# Patient Record
Sex: MALE | Race: WHITE | NOT HISPANIC OR LATINO | Employment: OTHER | URBAN - METROPOLITAN AREA
[De-identification: names, ages, dates, MRNs, and addresses within clinical notes are randomized per-mention and may not be internally consistent; named-entity substitution may affect disease eponyms.]

---

## 2017-04-19 NOTE — PATIENT DISCUSSION
"""OCT OU today. Stable. "" ""Follow ERM w/o surgery. Call if vision decreases or distortion increases. Recommend regular Amsler checks.  """

## 2018-01-30 ENCOUNTER — NEW PATIENT COMPREHENSIVE (OUTPATIENT)
Dept: URBAN - METROPOLITAN AREA CLINIC 47 | Facility: CLINIC | Age: 73
End: 2018-01-30

## 2018-01-30 DIAGNOSIS — H43.813: ICD-10-CM

## 2018-01-30 DIAGNOSIS — H40.033: ICD-10-CM

## 2018-01-30 DIAGNOSIS — H25.813: ICD-10-CM

## 2018-01-30 DIAGNOSIS — H04.123: ICD-10-CM

## 2018-01-30 PROCEDURE — 92132 CPTRZD OPH DX IMG ANT SGM: CPT

## 2018-01-30 PROCEDURE — 92015 DETERMINE REFRACTIVE STATE: CPT

## 2018-01-30 PROCEDURE — 92004 COMPRE OPH EXAM NEW PT 1/>: CPT

## 2018-01-30 ASSESSMENT — VISUAL ACUITY
OU_SC: J4
OU_SC: 20/30
OD_CC: J1
OS_CC: J1
OS_SC: 20/30-2
OU_CC: J1
OD_SC: 20/50+2
OD_SC: J8
OS_SC: J12

## 2018-01-30 ASSESSMENT — TONOMETRY
OD_IOP_MMHG: 14
OS_IOP_MMHG: 12

## 2018-07-10 NOTE — PATIENT DISCUSSION
""""" ""Follow ERM w/o surgery. Call if vision decreases or distortion increases. Recommend regular Amsler checks.  """

## 2019-11-19 ENCOUNTER — NEW PATIENT COMPREHENSIVE (OUTPATIENT)
Dept: URBAN - METROPOLITAN AREA CLINIC 43 | Facility: CLINIC | Age: 74
End: 2019-11-19

## 2019-11-19 DIAGNOSIS — H26.491: ICD-10-CM

## 2019-11-19 DIAGNOSIS — H04.123: ICD-10-CM

## 2019-11-19 DIAGNOSIS — H43.813: ICD-10-CM

## 2019-11-19 PROCEDURE — 92014 COMPRE OPH EXAM EST PT 1/>: CPT

## 2019-11-19 PROCEDURE — 92015 DETERMINE REFRACTIVE STATE: CPT

## 2019-11-19 ASSESSMENT — VISUAL ACUITY
OD_CC: 20/20-2
OD_SC: 20/40+2
OS_CC: J2
OD_SC: J12
OD_CC: J1
OS_SC: 20/60-2+2
OS_CC: 20/25-1+2
OS_SC: J10

## 2019-11-19 ASSESSMENT — TONOMETRY
OS_IOP_MMHG: 14
OD_IOP_MMHG: 14

## 2019-11-25 ENCOUNTER — CATARACT CONSULT (OUTPATIENT)
Dept: URBAN - METROPOLITAN AREA CLINIC 43 | Facility: CLINIC | Age: 74
End: 2019-11-25

## 2019-11-25 DIAGNOSIS — H52.203: ICD-10-CM

## 2019-11-25 DIAGNOSIS — H26.491: ICD-10-CM

## 2019-11-25 PROCEDURE — 99499LK REFRACTIVE CONSULT/LASIK

## 2019-11-25 PROCEDURE — 92134 CPTRZ OPH DX IMG PST SGM RTA: CPT

## 2019-11-25 PROCEDURE — 92025NC COMP. CORNEAL TOPO, UNI OR BILAT,

## 2019-11-25 PROCEDURE — 92286 ANT SGM IMG I&R SPECLR MIC: CPT

## 2019-11-25 RX ORDER — AMOXICILLIN 500 MG/1: 1 CAPSULE ORAL

## 2019-11-25 RX ORDER — MOXIFLOXACIN HYDROCHLORIDE 5 MG/ML: 1 SOLUTION/ DROPS OPHTHALMIC

## 2019-11-25 RX ORDER — PREDNISOLONE ACETATE 10 MG/ML: 1 SUSPENSION/ DROPS OPHTHALMIC

## 2019-11-25 RX ORDER — OXYCODONE HYDROCHLORIDE AND ACETAMINOPHEN 7.5; 325 MG/1; MG/1: 1 TABLET ORAL AS DIRECTED

## 2019-11-25 RX ORDER — ZOLPIDEM TARTRATE 5 MG/1: 1 TABLET ORAL EVERY EVENING

## 2019-11-25 ASSESSMENT — VISUAL ACUITY
OS_CC: J3
OS_SC: 20/50-2
OD_SC: 20/25+1
OD_CC: J1
OS_SC: J4
OD_SC: J8

## 2019-11-25 ASSESSMENT — TONOMETRY
OS_IOP_MMHG: 15
OD_IOP_MMHG: 12

## 2019-12-05 ENCOUNTER — SURGERY/PROCEDURE (OUTPATIENT)
Dept: URBAN - METROPOLITAN AREA SURGERY 14 | Facility: SURGERY | Age: 74
End: 2019-12-05

## 2019-12-05 DIAGNOSIS — H26.491: ICD-10-CM

## 2019-12-05 PROCEDURE — 66821 AFTER CATARACT LASER SURGERY: CPT

## 2019-12-06 ENCOUNTER — YAG POST-OP (OUTPATIENT)
Dept: URBAN - METROPOLITAN AREA CLINIC 43 | Facility: CLINIC | Age: 74
End: 2019-12-06

## 2019-12-06 DIAGNOSIS — Z98.890: ICD-10-CM

## 2019-12-06 DIAGNOSIS — H35.352: ICD-10-CM

## 2019-12-06 PROCEDURE — 99024 POSTOP FOLLOW-UP VISIT: CPT

## 2019-12-06 RX ORDER — BROMFENAC SODIUM 0.7 MG/ML: 1 SOLUTION/ DROPS OPHTHALMIC ONCE A DAY

## 2019-12-06 RX ORDER — FLUOROMETHOLONE ACETATE 1 MG/ML: 1 SUSPENSION/ DROPS OPHTHALMIC

## 2019-12-06 ASSESSMENT — TONOMETRY
OS_IOP_MMHG: 14
OD_IOP_MMHG: 14

## 2019-12-06 ASSESSMENT — VISUAL ACUITY
OD_SC: 20/25-1
OS_SC: 20/50-2

## 2020-01-16 ENCOUNTER — SURGERY/PROCEDURE (OUTPATIENT)
Dept: URBAN - METROPOLITAN AREA CLINIC 43 | Facility: CLINIC | Age: 75
End: 2020-01-16

## 2020-01-16 DIAGNOSIS — H52.7: ICD-10-CM

## 2020-01-16 PROCEDURE — 66999CEL EPI-LASEK (CONVENTIONAL EPI-LASIK)

## 2020-01-17 ENCOUNTER — 1 DAY LASIK POST OP (OUTPATIENT)
Dept: URBAN - METROPOLITAN AREA CLINIC 43 | Facility: CLINIC | Age: 75
End: 2020-01-17

## 2020-01-17 DIAGNOSIS — Z98.890: ICD-10-CM

## 2020-01-17 PROCEDURE — 99024 POSTOP FOLLOW-UP VISIT: CPT

## 2020-01-17 ASSESSMENT — VISUAL ACUITY
OS_SC: 20/100+1
OD_SC: 20/30+1

## 2020-01-24 ENCOUNTER — LASIK POST OP (OUTPATIENT)
Dept: URBAN - METROPOLITAN AREA CLINIC 43 | Facility: CLINIC | Age: 75
End: 2020-01-24

## 2020-01-24 DIAGNOSIS — Z98.890: ICD-10-CM

## 2020-01-24 PROCEDURE — 99024 POSTOP FOLLOW-UP VISIT: CPT

## 2020-01-24 ASSESSMENT — VISUAL ACUITY
OD_SC: 20/40-2
OD_PH: 20/30
OS_PH: 20/40-2
OS_SC: 20/80-1

## 2020-02-17 ENCOUNTER — LASIK POST OP (OUTPATIENT)
Dept: URBAN - METROPOLITAN AREA CLINIC 43 | Facility: CLINIC | Age: 75
End: 2020-02-17

## 2020-02-17 DIAGNOSIS — Z98.890: ICD-10-CM

## 2020-02-17 PROCEDURE — 99024 POSTOP FOLLOW-UP VISIT: CPT

## 2020-02-17 ASSESSMENT — TONOMETRY
OS_IOP_MMHG: 11
OD_IOP_MMHG: 10

## 2020-02-17 ASSESSMENT — VISUAL ACUITY
OU_SC: 20/30-2
OD_SC: 20/25-2
OS_SC: 20/40

## 2020-04-15 ENCOUNTER — LASIK POST OP (OUTPATIENT)
Dept: URBAN - METROPOLITAN AREA CLINIC 43 | Facility: CLINIC | Age: 75
End: 2020-04-15

## 2020-04-15 DIAGNOSIS — Z98.890: ICD-10-CM

## 2020-04-15 PROCEDURE — 99024 POSTOP FOLLOW-UP VISIT: CPT

## 2020-04-15 ASSESSMENT — VISUAL ACUITY
OU_SC: J3
OU_SC: 20/25-2
OS_SC: 20/30+2
OD_SC: 20/30-1
OS_SC: J6
OD_SC: J12

## 2020-04-15 ASSESSMENT — TONOMETRY
OS_IOP_MMHG: 12
OD_IOP_MMHG: 12

## 2020-05-26 ENCOUNTER — EST. PATIENT EMERGENCY (OUTPATIENT)
Dept: URBAN - METROPOLITAN AREA CLINIC 43 | Facility: CLINIC | Age: 75
End: 2020-05-26

## 2020-05-26 DIAGNOSIS — Z98.890: ICD-10-CM

## 2020-05-26 PROCEDURE — 99024 POSTOP FOLLOW-UP VISIT: CPT

## 2020-05-26 ASSESSMENT — VISUAL ACUITY
OS_SC: 20/25-3
OD_SC: 20/30-1

## 2020-05-26 ASSESSMENT — TONOMETRY
OS_IOP_MMHG: 12
OD_IOP_MMHG: 12

## 2020-09-14 NOTE — PATIENT DISCUSSION
GPC OU - MILD. RESIDUAL ALONG TARSAL PLATE; ASYMPTOMATIC. CORNEAL SENSITIVITY WNL OD/OS. UNDERSTANDS TO REMAIN OUT OF CLS UNTIL LASIK PROCEDURE TO AVOID FLARE UP.

## 2020-09-14 NOTE — PATIENT DISCUSSION
New Prescription: gatifloxacin (gatifloxacin): drops: 0.5% 1 drop four times a day into both eyes 09-

## 2020-09-14 NOTE — PATIENT DISCUSSION
----- Message from Erasto Ambrocio sent at 2/4/2020  9:14 AM CST -----  Contact: Wife/Sue Rogers called in regarding patient ().  Sue stated patient went to Ochsner [id number] - [place] [address] AT [facility] & [address] [address]  Phone: [phone] Fax: [phone]    Sue's call back number is [phone]   Current RX------------( 1.3 x 78 )--------------------( 0.75 x 102 )

## 2020-09-17 NOTE — PATIENT DISCUSSION
Continue: prednisolone acetate (prednisolone acetate): drops,suspension: 1% 1 drop four times a day into both eyes 09-

## 2020-09-17 NOTE — PATIENT DISCUSSION
MYOPIA, OU- DISC OPT OF REFRACTIVE [id number]. PT UNDERSTANDS OPTIONS AND DESIRES TO PROCEED WITH LASIK TO REDUCE DEPENDENCY ON GLS/CTLS.

## 2021-01-18 ENCOUNTER — ESTABLISHED COMPREHENSIVE EXAM (OUTPATIENT)
Dept: URBAN - METROPOLITAN AREA CLINIC 43 | Facility: CLINIC | Age: 76
End: 2021-01-18

## 2021-01-18 DIAGNOSIS — H35.352: ICD-10-CM

## 2021-01-18 DIAGNOSIS — Z98.890: ICD-10-CM

## 2021-01-18 DIAGNOSIS — H18.513: ICD-10-CM

## 2021-01-18 DIAGNOSIS — H04.123: ICD-10-CM

## 2021-01-18 PROCEDURE — 92014 COMPRE OPH EXAM EST PT 1/>: CPT

## 2021-01-18 PROCEDURE — 68761S PUNCTUM PLUG / SILICONE,EACH

## 2021-01-18 PROCEDURE — 92250 FUNDUS PHOTOGRAPHY W/I&R: CPT

## 2021-01-18 PROCEDURE — 92134 CPTRZ OPH DX IMG PST SGM RTA: CPT

## 2021-01-18 PROCEDURE — 92286 ANT SGM IMG I&R SPECLR MIC: CPT

## 2021-01-18 PROCEDURE — 92025 CPTRIZED CORNEAL TOPOGRAPHY: CPT

## 2021-01-18 PROCEDURE — A4263 PERMANENT TEAR DUCT PLUG: HCPCS

## 2021-01-18 ASSESSMENT — VISUAL ACUITY
OS_CC: J1
OD_SC: 20/25
OS_SC: J6
OD_SC: J6
OD_CC: J1
OS_SC: 20/50+2

## 2021-01-18 ASSESSMENT — TONOMETRY
OD_IOP_MMHG: 08
OS_IOP_MMHG: 11

## 2021-02-08 ENCOUNTER — FOLLOW UP (OUTPATIENT)
Dept: URBAN - METROPOLITAN AREA CLINIC 43 | Facility: CLINIC | Age: 76
End: 2021-02-08

## 2021-02-08 DIAGNOSIS — H35.352: ICD-10-CM

## 2021-02-08 DIAGNOSIS — Z98.890: ICD-10-CM

## 2021-02-08 DIAGNOSIS — H04.123: ICD-10-CM

## 2021-02-08 DIAGNOSIS — H18.513: ICD-10-CM

## 2021-02-08 PROCEDURE — 92012 INTRM OPH EXAM EST PATIENT: CPT

## 2021-02-08 PROCEDURE — 92134 CPTRZ OPH DX IMG PST SGM RTA: CPT

## 2021-02-08 RX ORDER — AMOXICILLIN 500 MG/1: 1 CAPSULE ORAL

## 2021-02-08 RX ORDER — PREDNISOLONE ACETATE 10 MG/ML: 1 SUSPENSION/ DROPS OPHTHALMIC

## 2021-02-08 RX ORDER — OXYCODONE HYDROCHLORIDE AND ACETAMINOPHEN 7.5; 325 MG/1; MG/1: 1 TABLET ORAL AS NEEDED

## 2021-02-08 RX ORDER — MOXIFLOXACIN HYDROCHLORIDE 5 MG/ML: 1 SOLUTION/ DROPS OPHTHALMIC

## 2021-02-08 ASSESSMENT — VISUAL ACUITY
OS_SC: 20/30-1
OD_SC: 20/30-2

## 2021-02-08 ASSESSMENT — TONOMETRY
OD_IOP_MMHG: 11
OS_IOP_MMHG: 12

## 2021-02-18 ENCOUNTER — SURGERY/PROCEDURE (OUTPATIENT)
Dept: URBAN - METROPOLITAN AREA CLINIC 43 | Facility: CLINIC | Age: 76
End: 2021-02-18

## 2021-02-18 DIAGNOSIS — H52.7: ICD-10-CM

## 2021-02-18 PROCEDURE — 66999E2

## 2021-02-19 ENCOUNTER — 1 DAY LASIK POST OP (OUTPATIENT)
Dept: URBAN - METROPOLITAN AREA CLINIC 43 | Facility: CLINIC | Age: 76
End: 2021-02-19

## 2021-02-19 DIAGNOSIS — H04.123: ICD-10-CM

## 2021-02-19 DIAGNOSIS — Z98.890: ICD-10-CM

## 2021-02-19 PROCEDURE — 99024 POSTOP FOLLOW-UP VISIT: CPT

## 2021-02-19 ASSESSMENT — VISUAL ACUITY
OS_SC: J10
OU_SC: J8
OD_SC: 20/25-2
OS_SC: 20/60
OD_SC: J12

## 2021-02-26 ENCOUNTER — LASIK POST OP (OUTPATIENT)
Dept: URBAN - METROPOLITAN AREA CLINIC 43 | Facility: CLINIC | Age: 76
End: 2021-02-26

## 2021-02-26 DIAGNOSIS — H04.123: ICD-10-CM

## 2021-02-26 DIAGNOSIS — Z98.890: ICD-10-CM

## 2021-02-26 PROCEDURE — 99024 POSTOP FOLLOW-UP VISIT: CPT

## 2021-02-26 ASSESSMENT — VISUAL ACUITY
OD_SC: 20/30-1
OD_SC: J12
OS_SC: 20/60-1
OS_SC: J12

## 2021-03-30 ENCOUNTER — LASIK POST OP (OUTPATIENT)
Dept: URBAN - METROPOLITAN AREA CLINIC 43 | Facility: CLINIC | Age: 76
End: 2021-03-30

## 2021-03-30 DIAGNOSIS — H04.123: ICD-10-CM

## 2021-03-30 PROCEDURE — 6699955 NON-COMANAGED LASIK PO

## 2021-03-30 RX ORDER — SODIUM CHLORIDE 50 MG/G: OINTMENT OPHTHALMIC EVERY EVENING

## 2021-03-30 ASSESSMENT — VISUAL ACUITY
OS_SC: 20/60
OS_PH: 20/25-2
OD_SC: J12
OS_SC: J10-
OD_SC: 20/25-1

## 2021-04-23 ENCOUNTER — PREPPED CHART (OUTPATIENT)
Dept: URBAN - METROPOLITAN AREA CLINIC 43 | Facility: CLINIC | Age: 76
End: 2021-04-23

## 2021-04-26 ENCOUNTER — POST-OP (OUTPATIENT)
Dept: URBAN - METROPOLITAN AREA CLINIC 43 | Facility: CLINIC | Age: 76
End: 2021-04-26

## 2021-04-26 DIAGNOSIS — H04.123: ICD-10-CM

## 2021-04-26 DIAGNOSIS — Z98.890: ICD-10-CM

## 2021-04-26 PROCEDURE — 6699955 NON-COMANAGED LASIK PO

## 2021-04-26 PROCEDURE — 92025 CPTRIZED CORNEAL TOPOGRAPHY: CPT

## 2021-04-26 ASSESSMENT — VISUAL ACUITY
OS_SC: 20/40+1
OD_CC: J1
OD_SC: 20/25-2
OD_SC: J12
OS_SC: J10
OS_CC: J1

## 2021-04-26 ASSESSMENT — TONOMETRY
OD_IOP_MMHG: 09
OS_IOP_MMHG: 11

## 2021-07-19 NOTE — PATIENT DISCUSSION
all if vision decreases or RD warning signs increases/RD warnings given. No signs of right eye retinal tear, retinal detachment precautions discussed.

## 2021-07-19 NOTE — PATIENT DISCUSSION
Discussion on dry eye and the importance of lubricating. 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 Could consider probing and irrigation if symptoms worsen. Start Artificial tears both eyes as needed.

## 2021-07-21 NOTE — PATIENT DISCUSSION
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 Elevated TF OS noted on exam. Discussed referral for dacryocystorhinostomy (DCR) OS, patient defers at this time.

## 2021-07-21 NOTE — PATIENT DISCUSSION
Low Risk Glaucoma Suspect OU based on notching OS with family history of glaucoma (father). Will order HVF/OCT/Pachy next available and repeat in 1 year.

## 2021-10-05 ENCOUNTER — POST-OP (OUTPATIENT)
Dept: URBAN - METROPOLITAN AREA CLINIC 43 | Facility: CLINIC | Age: 76
End: 2021-10-05

## 2021-10-05 DIAGNOSIS — Z98.890: ICD-10-CM

## 2021-10-05 DIAGNOSIS — H04.123: ICD-10-CM

## 2021-10-05 PROCEDURE — 99024 POSTOP FOLLOW-UP VISIT: CPT

## 2021-10-05 RX ORDER — CYCLOSPORINE 0 MG/ML: SOLUTION/ DROPS OPHTHALMIC; TOPICAL TWICE A DAY

## 2021-10-05 ASSESSMENT — VISUAL ACUITY
OD_SC: 20/25+3
OS_SC: 20/25-3

## 2021-10-27 ENCOUNTER — POST-OP (OUTPATIENT)
Dept: URBAN - METROPOLITAN AREA CLINIC 43 | Facility: CLINIC | Age: 76
End: 2021-10-27

## 2021-10-27 DIAGNOSIS — H04.123: ICD-10-CM

## 2021-10-27 DIAGNOSIS — Z98.890: ICD-10-CM

## 2021-10-27 PROCEDURE — 99024 POSTOP FOLLOW-UP VISIT: CPT

## 2021-10-27 ASSESSMENT — VISUAL ACUITY
OD_SC: J12
OS_SC: 20/30-2
OD_SC: 20/25-1+2
OS_SC: J12

## 2021-10-27 ASSESSMENT — TONOMETRY
OD_IOP_MMHG: 14
OS_IOP_MMHG: 14

## 2021-11-24 NOTE — PATIENT DISCUSSION
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 Elevated TF OS noted on exam. Will refer to Dr. Ayala Reeder for dacryocystorhinostomy ([facility].

## 2022-01-27 ENCOUNTER — EMERGENCY VISIT (OUTPATIENT)
Dept: URBAN - METROPOLITAN AREA CLINIC 43 | Facility: CLINIC | Age: 77
End: 2022-01-27

## 2022-01-27 DIAGNOSIS — H43.813: ICD-10-CM

## 2022-01-27 PROCEDURE — 92012 INTRM OPH EXAM EST PATIENT: CPT

## 2022-01-27 ASSESSMENT — TONOMETRY
OS_IOP_MMHG: 12
OD_IOP_MMHG: 10

## 2022-01-27 ASSESSMENT — VISUAL ACUITY
OS_SC: 20/30+2
OU_SC: 20/25-1
OD_SC: 20/25-2

## 2022-03-30 NOTE — PATIENT DISCUSSION
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 Elevated TF OS noted on exam. Will refer to Dr. Dwayne Majano for dacryocystorhinostomy (2025 [facility].

## 2022-04-19 NOTE — PATIENT DISCUSSION
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 Elevated TF OS noted on exam. Will refer to Dr. Wale Nieto for dacryocystorhinostomy (2025 [facility].

## 2022-04-27 ENCOUNTER — COMPREHENSIVE EXAM (OUTPATIENT)
Dept: URBAN - METROPOLITAN AREA CLINIC 43 | Facility: CLINIC | Age: 77
End: 2022-04-27

## 2022-04-27 DIAGNOSIS — Z98.890: ICD-10-CM

## 2022-04-27 PROCEDURE — 99024 POSTOP FOLLOW-UP VISIT: CPT

## 2022-04-27 ASSESSMENT — VISUAL ACUITY
OD_SC: 20/25-3
OS_CC: J2
OS_SC: 20/30-2
OS_SC: J12
OD_SC: J12
OD_CC: J1

## 2022-04-27 ASSESSMENT — TONOMETRY
OD_IOP_MMHG: 6
OS_IOP_MMHG: 8

## 2022-05-20 NOTE — PATIENT DISCUSSION
Start WC ou tid with Impact mask, Bactrim DS PO BID x 7 days, tobradex os qid.  F/u 10 days Dr. Toñito Mercer.

## 2022-05-20 NOTE — PATIENT DISCUSSION
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 Elevated TF OS noted on exam. Will refer to Dr. Parvez Canales for dacryocystorhinostomy (2025 [facility].

## 2022-06-14 NOTE — PATIENT DISCUSSION
If patient has another flair up could consider starting a cycle of Doxycycline. no fever and no chills.

## 2022-06-14 NOTE — PATIENT DISCUSSION
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 Elevated TF OS noted on exam. Will refer to Dr. Rebecca Presley for dacryocystorhinostomy (2025 [facility].

## 2023-04-27 ENCOUNTER — COMPREHENSIVE EXAM (OUTPATIENT)
Dept: URBAN - METROPOLITAN AREA CLINIC 43 | Facility: CLINIC | Age: 78
End: 2023-04-27

## 2023-04-27 DIAGNOSIS — H52.4: ICD-10-CM

## 2023-04-27 DIAGNOSIS — H43.813: ICD-10-CM

## 2023-04-27 DIAGNOSIS — H04.123: ICD-10-CM

## 2023-04-27 PROCEDURE — 92015 DETERMINE REFRACTIVE STATE: CPT

## 2023-04-27 PROCEDURE — 92014 COMPRE OPH EXAM EST PT 1/>: CPT

## 2023-04-27 ASSESSMENT — TONOMETRY
OS_IOP_MMHG: 10
OD_IOP_MMHG: 10

## 2023-04-27 ASSESSMENT — VISUAL ACUITY
OD_SC: J12
OS_CC: J3
OS_SC: J12
OD_SC: 20/20-2
OS_SC: 20/40-1
OD_CC: J1

## 2023-12-13 ENCOUNTER — CONSULTATION/EVALUATION (OUTPATIENT)
Dept: URBAN - METROPOLITAN AREA CLINIC 43 | Facility: CLINIC | Age: 78
End: 2023-12-13

## 2023-12-13 DIAGNOSIS — H35.352: ICD-10-CM

## 2023-12-13 DIAGNOSIS — H04.123: ICD-10-CM

## 2023-12-13 DIAGNOSIS — H18.513: ICD-10-CM

## 2023-12-13 DIAGNOSIS — Z96.1: ICD-10-CM

## 2023-12-13 DIAGNOSIS — H43.813: ICD-10-CM

## 2023-12-13 PROCEDURE — 99199RSD RESIDENT SHADOWING PROVIDER

## 2023-12-13 PROCEDURE — 92134 CPTRZ OPH DX IMG PST SGM RTA: CPT

## 2023-12-13 PROCEDURE — 99214 OFFICE O/P EST MOD 30 MIN: CPT

## 2023-12-13 PROCEDURE — 68761Q PUNCTAL PLUG/QUINTESS DISSOLVABLE PLUG/EACH

## 2023-12-13 PROCEDURE — 92025 CPTRIZED CORNEAL TOPOGRAPHY: CPT

## 2023-12-13 PROCEDURE — 92286 ANT SGM IMG I&R SPECLR MIC: CPT

## 2023-12-13 PROCEDURE — A4262 TEMPORARY TEAR DUCT PLUG: HCPCS

## 2023-12-13 ASSESSMENT — TONOMETRY
OD_IOP_MMHG: 12
OS_IOP_MMHG: 12

## 2023-12-13 ASSESSMENT — VISUAL ACUITY
OS_SC: 20/40-1
OD_SC: J12
OD_SC: 20/25+1
OD_CC: J2
OS_SC: J12
OS_CC: J3

## 2024-06-12 ENCOUNTER — FOLLOW UP (OUTPATIENT)
Dept: URBAN - METROPOLITAN AREA CLINIC 43 | Facility: CLINIC | Age: 79
End: 2024-06-12

## 2024-06-12 DIAGNOSIS — H04.123: ICD-10-CM

## 2024-06-12 DIAGNOSIS — H18.513: ICD-10-CM

## 2024-06-12 DIAGNOSIS — Z96.1: ICD-10-CM

## 2024-06-12 PROCEDURE — A4262 TEMPORARY TEAR DUCT PLUG: HCPCS | Mod: E1,LT,E2,LT

## 2024-06-12 PROCEDURE — 99213 OFFICE O/P EST LOW 20 MIN: CPT | Mod: 25

## 2024-06-12 PROCEDURE — 68761Q PUNCTAL PLUG/QUINTESS DISSOLVABLE PLUG/EACH: Mod: E1,LT,E2,LT

## 2024-06-12 ASSESSMENT — VISUAL ACUITY
OS_SC: 20/40-2
OD_SC: 20/20-2

## 2024-06-12 ASSESSMENT — TONOMETRY
OD_IOP_MMHG: 7
OS_IOP_MMHG: 8

## 2024-06-18 ENCOUNTER — CLINIC PROCEDURE ONLY (OUTPATIENT)
Dept: URBAN - METROPOLITAN AREA CLINIC 39 | Facility: CLINIC | Age: 79
End: 2024-06-18

## 2024-06-18 DIAGNOSIS — H04.123: ICD-10-CM

## 2024-06-18 PROCEDURE — 99199ST SERUM TEARS

## 2024-07-15 ENCOUNTER — FOLLOW UP (OUTPATIENT)
Dept: URBAN - METROPOLITAN AREA CLINIC 43 | Facility: CLINIC | Age: 79
End: 2024-07-15

## 2024-07-15 DIAGNOSIS — H02.89: ICD-10-CM

## 2024-07-15 DIAGNOSIS — H18.513: ICD-10-CM

## 2024-07-15 DIAGNOSIS — H35.352: ICD-10-CM

## 2024-07-15 DIAGNOSIS — H04.123: ICD-10-CM

## 2024-07-15 PROCEDURE — 99213 OFFICE O/P EST LOW 20 MIN: CPT

## 2024-07-15 PROCEDURE — 92134 CPTRZ OPH DX IMG PST SGM RTA: CPT

## 2024-07-15 ASSESSMENT — VISUAL ACUITY
OS_SC: J12
OD_SC: 20/25-2
OS_CC: 20/20-1
OD_CC: 20/20
OS_CC: J1
OS_SC: 20/30+2
OD_SC: J12
OD_CC: J1

## 2024-07-15 ASSESSMENT — TONOMETRY
OD_IOP_MMHG: 10
OS_IOP_MMHG: 10

## 2024-12-16 ENCOUNTER — CLINIC PROCEDURE ONLY (OUTPATIENT)
Age: 79
End: 2024-12-16

## 2024-12-16 DIAGNOSIS — H04.123: ICD-10-CM

## 2024-12-16 PROCEDURE — 99199ST SERUM TEARS

## 2025-04-02 ENCOUNTER — FOLLOW UP (OUTPATIENT)
Age: 80
End: 2025-04-02

## 2025-04-02 DIAGNOSIS — H02.89: ICD-10-CM

## 2025-04-02 DIAGNOSIS — H04.123: ICD-10-CM

## 2025-04-02 DIAGNOSIS — H43.312: ICD-10-CM

## 2025-04-02 PROCEDURE — 99214 OFFICE O/P EST MOD 30 MIN: CPT

## 2025-04-02 RX ORDER — PREDNISOLONE ACETATE 10 MG/ML: 1 SUSPENSION/ DROPS OPHTHALMIC AS DIRECTED

## 2025-04-28 ENCOUNTER — CONSULTATION/EVALUATION (OUTPATIENT)
Age: 80
End: 2025-04-28

## 2025-04-28 DIAGNOSIS — H43.312: ICD-10-CM

## 2025-04-28 PROCEDURE — 99214 OFFICE O/P EST MOD 30 MIN: CPT | Mod: 57

## 2025-05-06 ENCOUNTER — CONSULTATION/EVALUATION (OUTPATIENT)
Age: 80
End: 2025-05-06

## 2025-05-06 DIAGNOSIS — H43.312: ICD-10-CM

## 2025-05-06 PROCEDURE — 67031 LASER SURGERY EYE STRANDS: CPT

## 2025-05-12 ENCOUNTER — COMPREHENSIVE EXAM (OUTPATIENT)
Age: 80
End: 2025-05-12

## 2025-05-12 DIAGNOSIS — H04.123: ICD-10-CM

## 2025-05-12 DIAGNOSIS — H02.89: ICD-10-CM

## 2025-05-12 DIAGNOSIS — H43.813: ICD-10-CM

## 2025-05-12 DIAGNOSIS — H52.203: ICD-10-CM

## 2025-05-12 DIAGNOSIS — H52.4: ICD-10-CM

## 2025-05-12 DIAGNOSIS — H43.312: ICD-10-CM

## 2025-05-12 PROCEDURE — 92014 COMPRE OPH EXAM EST PT 1/>: CPT

## 2025-05-12 PROCEDURE — 92134 CPTRZ OPH DX IMG PST SGM RTA: CPT

## 2025-05-12 PROCEDURE — 92015 DETERMINE REFRACTIVE STATE: CPT
